# Patient Record
Sex: MALE | Race: WHITE | NOT HISPANIC OR LATINO | URBAN - METROPOLITAN AREA
[De-identification: names, ages, dates, MRNs, and addresses within clinical notes are randomized per-mention and may not be internally consistent; named-entity substitution may affect disease eponyms.]

---

## 2017-04-09 ENCOUNTER — INPATIENT (INPATIENT)
Facility: HOSPITAL | Age: 82
LOS: 4 days | Discharge: NURSING, REHAB OR LONG-TERM CARE | End: 2017-04-14
Payer: MEDICARE

## 2017-04-09 VITALS
OXYGEN SATURATION: 96 % | WEIGHT: 179.9 LBS | HEIGHT: 68 IN | TEMPERATURE: 99 F | RESPIRATION RATE: 18 BRPM | DIASTOLIC BLOOD PRESSURE: 68 MMHG | HEART RATE: 72 BPM | SYSTOLIC BLOOD PRESSURE: 135 MMHG

## 2017-04-09 DIAGNOSIS — I50.32 CHRONIC DIASTOLIC (CONGESTIVE) HEART FAILURE: ICD-10-CM

## 2017-04-09 DIAGNOSIS — Z98.890 OTHER SPECIFIED POSTPROCEDURAL STATES: Chronic | ICD-10-CM

## 2017-04-09 DIAGNOSIS — G20 PARKINSON'S DISEASE: ICD-10-CM

## 2017-04-09 DIAGNOSIS — I82.401 ACUTE EMBOLISM AND THROMBOSIS OF UNSPECIFIED DEEP VEINS OF RIGHT LOWER EXTREMITY: ICD-10-CM

## 2017-04-09 DIAGNOSIS — N17.9 ACUTE KIDNEY FAILURE, UNSPECIFIED: ICD-10-CM

## 2017-04-09 DIAGNOSIS — R26.9 UNSPECIFIED ABNORMALITIES OF GAIT AND MOBILITY: ICD-10-CM

## 2017-04-09 DIAGNOSIS — Z94.5 SKIN TRANSPLANT STATUS: Chronic | ICD-10-CM

## 2017-04-09 LAB
ALBUMIN SERPL ELPH-MCNC: 3 G/DL — LOW (ref 3.3–5)
ALP SERPL-CCNC: 127 U/L — HIGH (ref 40–120)
ALT FLD-CCNC: <6 U/L — LOW (ref 12–78)
ANION GAP SERPL CALC-SCNC: 11 MMOL/L — SIGNIFICANT CHANGE UP (ref 5–17)
APTT BLD: 30.5 SEC — SIGNIFICANT CHANGE UP (ref 27.5–37.4)
APTT BLD: > 200 SEC (ref 27.5–37.4)
AST SERPL-CCNC: 9 U/L — LOW (ref 15–37)
BASOPHILS # BLD AUTO: 0.1 K/UL — SIGNIFICANT CHANGE UP (ref 0–0.2)
BASOPHILS NFR BLD AUTO: 0.8 % — SIGNIFICANT CHANGE UP (ref 0–2)
BILIRUB SERPL-MCNC: 0.4 MG/DL — SIGNIFICANT CHANGE UP (ref 0.2–1.2)
BUN SERPL-MCNC: 56 MG/DL — HIGH (ref 7–23)
CALCIUM SERPL-MCNC: 9.1 MG/DL — SIGNIFICANT CHANGE UP (ref 8.5–10.1)
CHLORIDE SERPL-SCNC: 105 MMOL/L — SIGNIFICANT CHANGE UP (ref 96–108)
CO2 SERPL-SCNC: 27 MMOL/L — SIGNIFICANT CHANGE UP (ref 22–31)
CREAT SERPL-MCNC: 1.91 MG/DL — HIGH (ref 0.5–1.3)
D DIMER BLD IA.RAPID-MCNC: 2862 NG/ML DDU — HIGH
EOSINOPHIL # BLD AUTO: 0 K/UL — SIGNIFICANT CHANGE UP (ref 0–0.5)
EOSINOPHIL NFR BLD AUTO: 0.3 % — SIGNIFICANT CHANGE UP (ref 0–6)
GLUCOSE SERPL-MCNC: 111 MG/DL — HIGH (ref 70–99)
HCT VFR BLD CALC: 33.5 % — LOW (ref 39–50)
HCT VFR BLD CALC: 37.9 % — LOW (ref 39–50)
HGB BLD-MCNC: 11.3 G/DL — LOW (ref 13–17)
HGB BLD-MCNC: 12.4 G/DL — LOW (ref 13–17)
INR BLD: 1.11 RATIO — SIGNIFICANT CHANGE UP (ref 0.88–1.16)
LYMPHOCYTES # BLD AUTO: 1.1 K/UL — SIGNIFICANT CHANGE UP (ref 1–3.3)
LYMPHOCYTES # BLD AUTO: 13 % — SIGNIFICANT CHANGE UP (ref 13–44)
MCHC RBC-ENTMCNC: 29.2 PG — SIGNIFICANT CHANGE UP (ref 27–34)
MCHC RBC-ENTMCNC: 29.4 PG — SIGNIFICANT CHANGE UP (ref 27–34)
MCHC RBC-ENTMCNC: 32.6 GM/DL — SIGNIFICANT CHANGE UP (ref 32–36)
MCHC RBC-ENTMCNC: 33.6 GM/DL — SIGNIFICANT CHANGE UP (ref 32–36)
MCV RBC AUTO: 87.7 FL — SIGNIFICANT CHANGE UP (ref 80–100)
MCV RBC AUTO: 89.6 FL — SIGNIFICANT CHANGE UP (ref 80–100)
MONOCYTES # BLD AUTO: 0.8 K/UL — SIGNIFICANT CHANGE UP (ref 0–0.9)
MONOCYTES NFR BLD AUTO: 9.3 % — SIGNIFICANT CHANGE UP (ref 2–14)
NEUTROPHILS # BLD AUTO: 6.7 K/UL — SIGNIFICANT CHANGE UP (ref 1.8–7.4)
NEUTROPHILS NFR BLD AUTO: 76.6 % — SIGNIFICANT CHANGE UP (ref 43–77)
PLATELET # BLD AUTO: 258 K/UL — SIGNIFICANT CHANGE UP (ref 150–400)
PLATELET # BLD AUTO: 263 K/UL — SIGNIFICANT CHANGE UP (ref 150–400)
POTASSIUM SERPL-MCNC: 4.4 MMOL/L — SIGNIFICANT CHANGE UP (ref 3.5–5.3)
POTASSIUM SERPL-SCNC: 4.4 MMOL/L — SIGNIFICANT CHANGE UP (ref 3.5–5.3)
PROT SERPL-MCNC: 6.9 GM/DL — SIGNIFICANT CHANGE UP (ref 6–8.3)
PROTHROM AB SERPL-ACNC: 12 SEC — SIGNIFICANT CHANGE UP (ref 9.8–12.7)
RBC # BLD: 3.83 M/UL — LOW (ref 4.2–5.8)
RBC # BLD: 4.23 M/UL — SIGNIFICANT CHANGE UP (ref 4.2–5.8)
RBC # FLD: 13 % — SIGNIFICANT CHANGE UP (ref 10.3–14.5)
RBC # FLD: 13.7 % — SIGNIFICANT CHANGE UP (ref 10.3–14.5)
SODIUM SERPL-SCNC: 143 MMOL/L — SIGNIFICANT CHANGE UP (ref 135–145)
TROPONIN I SERPL-MCNC: <0.015 NG/ML — SIGNIFICANT CHANGE UP (ref 0.01–0.04)
TROPONIN I SERPL-MCNC: <0.015 NG/ML — SIGNIFICANT CHANGE UP (ref 0.01–0.04)
WBC # BLD: 8.7 K/UL — SIGNIFICANT CHANGE UP (ref 3.8–10.5)
WBC # BLD: 8.9 K/UL — SIGNIFICANT CHANGE UP (ref 3.8–10.5)
WBC # FLD AUTO: 8.7 K/UL — SIGNIFICANT CHANGE UP (ref 3.8–10.5)
WBC # FLD AUTO: 8.9 K/UL — SIGNIFICANT CHANGE UP (ref 3.8–10.5)

## 2017-04-09 PROCEDURE — 93970 EXTREMITY STUDY: CPT | Mod: 26

## 2017-04-09 PROCEDURE — 93010 ELECTROCARDIOGRAM REPORT: CPT

## 2017-04-09 PROCEDURE — 71010: CPT | Mod: 26

## 2017-04-09 PROCEDURE — 99285 EMERGENCY DEPT VISIT HI MDM: CPT

## 2017-04-09 RX ORDER — WARFARIN SODIUM 2.5 MG/1
2.5 TABLET ORAL ONCE
Qty: 0 | Refills: 0 | Status: COMPLETED | OUTPATIENT
Start: 2017-04-09 | End: 2017-04-09

## 2017-04-09 RX ORDER — POTASSIUM CHLORIDE 20 MEQ
0 PACKET (EA) ORAL
Qty: 0 | Refills: 0 | COMMUNITY

## 2017-04-09 RX ORDER — HEPARIN SODIUM 5000 [USP'U]/ML
6500 INJECTION INTRAVENOUS; SUBCUTANEOUS EVERY 6 HOURS
Qty: 0 | Refills: 0 | Status: DISCONTINUED | OUTPATIENT
Start: 2017-04-09 | End: 2017-04-13

## 2017-04-09 RX ORDER — HEPARIN SODIUM 5000 [USP'U]/ML
INJECTION INTRAVENOUS; SUBCUTANEOUS
Qty: 25000 | Refills: 0 | Status: DISCONTINUED | OUTPATIENT
Start: 2017-04-09 | End: 2017-04-13

## 2017-04-09 RX ORDER — CARBIDOPA AND LEVODOPA 25; 100 MG/1; MG/1
2 TABLET ORAL AT BEDTIME
Qty: 0 | Refills: 0 | Status: DISCONTINUED | OUTPATIENT
Start: 2017-04-09 | End: 2017-04-14

## 2017-04-09 RX ORDER — HEPARIN SODIUM 5000 [USP'U]/ML
3000 INJECTION INTRAVENOUS; SUBCUTANEOUS EVERY 6 HOURS
Qty: 0 | Refills: 0 | Status: DISCONTINUED | OUTPATIENT
Start: 2017-04-09 | End: 2017-04-13

## 2017-04-09 RX ORDER — CARBIDOPA AND LEVODOPA 25; 100 MG/1; MG/1
0 TABLET ORAL
Qty: 0 | Refills: 0 | COMMUNITY

## 2017-04-09 RX ORDER — CARBIDOPA AND LEVODOPA 25; 100 MG/1; MG/1
2 TABLET ORAL
Qty: 0 | Refills: 0 | COMMUNITY

## 2017-04-09 RX ORDER — SODIUM CHLORIDE 9 MG/ML
3 INJECTION INTRAMUSCULAR; INTRAVENOUS; SUBCUTANEOUS ONCE
Qty: 0 | Refills: 0 | Status: COMPLETED | OUTPATIENT
Start: 2017-04-09 | End: 2017-04-09

## 2017-04-09 RX ORDER — ASPIRIN/CALCIUM CARB/MAGNESIUM 324 MG
325 TABLET ORAL ONCE
Qty: 0 | Refills: 0 | Status: COMPLETED | OUTPATIENT
Start: 2017-04-09 | End: 2017-04-09

## 2017-04-09 RX ORDER — HEPARIN SODIUM 5000 [USP'U]/ML
6500 INJECTION INTRAVENOUS; SUBCUTANEOUS ONCE
Qty: 0 | Refills: 0 | Status: COMPLETED | OUTPATIENT
Start: 2017-04-09 | End: 2017-04-09

## 2017-04-09 RX ADMIN — HEPARIN SODIUM 1500 UNIT(S)/HR: 5000 INJECTION INTRAVENOUS; SUBCUTANEOUS at 16:48

## 2017-04-09 RX ADMIN — HEPARIN SODIUM 0 UNIT(S)/HR: 5000 INJECTION INTRAVENOUS; SUBCUTANEOUS at 23:30

## 2017-04-09 RX ADMIN — HEPARIN SODIUM 6500 UNIT(S): 5000 INJECTION INTRAVENOUS; SUBCUTANEOUS at 16:48

## 2017-04-09 RX ADMIN — Medication 325 MILLIGRAM(S): at 13:42

## 2017-04-09 RX ADMIN — WARFARIN SODIUM 2.5 MILLIGRAM(S): 2.5 TABLET ORAL at 23:38

## 2017-04-09 NOTE — ED ADULT NURSE REASSESSMENT NOTE - COMFORT CARE
repositioned/side rails up/plan of care explained
plan of care explained/repositioned/wait time explained/side rails up/po fluids offered
warm blanket provided/side rails up/pt incontinent.  Skin care given and linens changed pt repositioned for comfort and skin.

## 2017-04-09 NOTE — ED PROVIDER NOTE - NS ED MD SCRIBE ATTENDING SCRIBE SECTIONS
RESULTS/PROGRESS NOTE/PHYSICAL EXAM/PAST MEDICAL/SURGICAL/SOCIAL HISTORY/DISPOSITION/REVIEW OF SYSTEMS/HISTORY OF PRESENT ILLNESS

## 2017-04-09 NOTE — H&P ADULT - PROBLEM SELECTOR PLAN 3
Hold Lasix and reevaluate. Daily weight. Cardiology to advise. Hold Lasix and reevaluate. Daily weight. Cardiology to advise. Patient was scheduled to have Echo at office.

## 2017-04-09 NOTE — H&P ADULT - NSHPLABSRESULTS_GEN_ALL_CORE
EKG NSR @ 76 BANDAR JENKINS old when compared with EKG from June 26, 2015. 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    Venous Doppler US of legs: Occlusive thrombus is seen in the RIGHT common femoral,   superficial femoral and popliteal veins. Scant flow is seen within the   RIGHT peroneal and posterior tibial veins. Calf edema is seen.

## 2017-04-09 NOTE — ED ADULT NURSE NOTE - CHIEF COMPLAINT QUOTE
Patients daughter states that he had swelling last week in his ankle and is getting progressively worse no all the way up his right leg up to thigh. Cardiologist doubled the lasix.

## 2017-04-09 NOTE — ED PROVIDER NOTE - MEDICAL DECISION MAKING DETAILS
VQ scan is ordered. Patient is s/p heparin infusion. Patient is to be admitted for further inpatient evaluation. Hospitalist admission of the patient is appreciated. Melissa ZEPEDA: VQ scan is ordered. Patient is s/p heparin infusion. Patient is to be admitted for further inpatient evaluation. Hospitalist admission of the patient is appreciated.

## 2017-04-09 NOTE — H&P ADULT - NSHPSOCIALHISTORY_GEN_ALL_CORE
. Lives with wife.  Three children.  Retired dental technician.  Never smoked.   Occasional social drinker and not any more.
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
## 2017-04-09 NOTE — ED PROVIDER NOTE - PROGRESS NOTE DETAILS
Spoke with admitting hospitalist, Dr. Pelayo. Patient's admission is appreciated. Patient is s/p heparin. Elevated d-dimer. Creatinine high for CTA. V/Q scan ordered. Melissa ZEPEDA: Spoke with admitting hospitalist, Dr. Pelayo. Patient's admission is appreciated. Patient is s/p heparin. Elevated d-dimer. Creatinine high for CTA. V/Q scan ordered.

## 2017-04-09 NOTE — ED PROVIDER NOTE - CARE PLAN
Principal Discharge DX:	Deep vein thrombosis (DVT) of right lower extremity, unspecified chronicity, unspecified vein

## 2017-04-09 NOTE — H&P ADULT - HISTORY OF PRESENT ILLNESS
88 y/o M with hx of CHF, Parkinson's, presents for LE edema x 1 week, worsening. RLE swelling gradually went up to thigh, LLE swelling limited to foot. Denies CP, SOB, palpitations, abd pain, extremity pain. Pt saw Dr. Bronson recently who doubled his dose of lasix. 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 RLE swelling gradually went up to thigh, LLE swelling limited to foot. Denies CP, SOB, palpitations, abd pain, extremity pain. Pt saw Dr. Cedillo recently who doubled his dose of lasix from 20 mg to 40 mg and last few days 40mg BID. 89 y/o M with hx of Diastolic CHF, Parkinson's, RBBB, Gait Disorder uses walker and Heart Murmur presents for LE edema x 1 week, worsening. RLE swelling gradually went up to thigh, LLE swelling limited to foot. Denies CP, SOB, palpitations, abd pain, extremity pain. Pt saw Dr. Cedillo recently who doubled his dose of lasix from 20 mg to 40 mg and last few days 40mg BID.

## 2017-04-09 NOTE — H&P ADULT - PROBLEM SELECTOR PLAN 2
Hold Lasix and repeat BMP. Cardiology to advise. If renal dysfunction persist then renal us and consultation.

## 2017-04-09 NOTE — H&P ADULT - ASSESSMENT
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 RLE swelling gradually went up to thigh, LLE swelling limited to foot. Denies CP, SOB, palpitations, abd pain, extremity pain. Pt saw Dr. Cedillo recently who doubled his dose of lasix from 20 mg to 40 mg and last few days 40mg BID. Being admitted for right Femoral DVT with suspected MARCELL due to increased diuresis. 88 y/o M with hx of Diastolic CHF, Parkinson's, RBBB, Gait Disorder uses walker and Heart Murmur presents for LE edema x 1 week, worsening. RLE swelling gradually went up to thigh, LLE swelling limited to foot. Denies CP, SOB, palpitations, abd pain, extremity pain. Pt saw Dr. Cedillo recently who doubled his dose of lasix from 20 mg to 40 mg and last few days 40mg BID. Being admitted for right Femoral DVT with suspected MARCELL due to increased diuresis.

## 2017-04-09 NOTE — H&P ADULT - NSHPREVIEWOFSYSTEMS_GEN_ALL_CORE
Patient denies dysuria, bleeding, cough, difficulty swallowing, diarrhea, abdominal pain, headache, fall or LOC.   Rest of the review of systems is negative.

## 2017-04-09 NOTE — ED PROVIDER NOTE - DETAILS:
I Carrington Torres MD saw and examiend this patient. Scribe documented for me and under my supervision. I have made changes to scribe's documentation where necessary to reflect my history and physical exam findings. I agree with documentation as it appears on the chart now.

## 2017-04-09 NOTE — ED PROVIDER NOTE - OBJECTIVE STATEMENT
89 y/o M with hx of CHF, Parkinson's, presents for LE edema x 1 week, worsening. RLE swelling gradually went up to thigh, LLE swelling limited to foot. Denies CP, SOB, palpitations, abd pain, extremity pain. Pt saw Dr. Bronson recently who doubled his dose of lasix. 88 y/o M with hx of CHF, Parkinson's, presents for LE edema x 1 week, worsening. RLE swelling gradually went up to thigh, LLE swelling limited to foot. Denies CP, SOB, palpitations, abd pain,  or extremity pain. Pt saw Dr. Bronson recently who doubled his dose of lasix. Here to rule out DVT.

## 2017-04-09 NOTE — ED PROVIDER NOTE - MUSCULOSKELETAL, MLM
Spine appears normal, range of motion is not limited, no muscle or joint tenderness Spine appears normal, range of motion is not limited, no muscle or joint tenderness. RLE: 2+ pitting edema up to the thigh. LLE: 2+ pitting edema limited to the foot. Spine appears normal, range of motion is not limited, no muscle or joint tenderness. RLE: 2+ pitting edema up to the thigh. LLE: 2+ pitting edema limited to the dorsum of foot.

## 2017-04-09 NOTE — H&P ADULT - NSHPPHYSICALEXAM_GEN_ALL_CORE
T(C): 37.1, Max: 37.4 (04-09 @ 12:01)  HR: 77 (72 - 77)  BP: 107/44 (101/37 - 152/44)  RR: 16 (15 - 18)  SpO2: 100% (96% - 100%)

## 2017-04-09 NOTE — ED PROVIDER NOTE - CONSTITUTIONAL, MLM
normal... Well appearing, well nourished, awake, alert, oriented to person, place, time/situation and in no apparent distress. Well appearing, well nourished, awake, alert, oriented to person, place, time/situation and in no apparent distress. Nontoxic appearance.

## 2017-04-09 NOTE — H&P ADULT - PROBLEM SELECTOR PLAN 1
IV Heparin and Coumadin. Follow up INR. If renal function improves then reevaluate for newer agents. Risks of bleeding discussed especially with fall. Daughter, patient and wife would like to continue anticoagulation.

## 2017-04-09 NOTE — ED ADULT NURSE REASSESSMENT NOTE - NS ED NURSE REASSESS COMMENT FT1
Pt noted to have an area of skin breakdown around the coccyx.  2 quarter size circles purple in color.  Pt positioned on side and supported by pillows.  Family made aware of skin condition.  Left arm noted to have multiple areas of ecchymosis.

## 2017-04-09 NOTE — H&P ADULT - ATTENDING COMMENTS
Diet: Low salt and cholesterol     RADIOLOGY & ADDITIONAL TESTS:    Imaging Personally Reviewed:  [ x] YES  [ ] NO    Consultant(s) Notes Reviewed:  [ ] YES  [ ] NO       DVT Prophylaxis:  IV Heparin [  x]     LMWH [ ]     Coumadin [ x]    Xaeralto [ ]    Eliquis [ ]   Venodyne pumps [ ]    Discussed with Patient [x ]     Family [x ]          [ ]   RN[x ]      [ ]    Advance Directives: Spoke with family about advance directives including HCP.        Care Discussed with Consultants/Other Providers [ ] YES  [ ] NO PCP and cardiology office contacted and notified.

## 2017-04-10 LAB
ANION GAP SERPL CALC-SCNC: 9 MMOL/L — SIGNIFICANT CHANGE UP (ref 5–17)
APTT BLD: 167.9 SEC — CRITICAL HIGH (ref 27.5–37.4)
APTT BLD: 70.1 SEC — HIGH (ref 27.5–37.4)
APTT BLD: 90.3 SEC — HIGH (ref 27.5–37.4)
BUN SERPL-MCNC: 51 MG/DL — HIGH (ref 7–23)
CALCIUM SERPL-MCNC: 8.6 MG/DL — SIGNIFICANT CHANGE UP (ref 8.5–10.1)
CHLORIDE SERPL-SCNC: 108 MMOL/L — SIGNIFICANT CHANGE UP (ref 96–108)
CO2 SERPL-SCNC: 28 MMOL/L — SIGNIFICANT CHANGE UP (ref 22–31)
CREAT SERPL-MCNC: 1.59 MG/DL — HIGH (ref 0.5–1.3)
GLUCOSE SERPL-MCNC: 93 MG/DL — SIGNIFICANT CHANGE UP (ref 70–99)
HCT VFR BLD CALC: 33.7 % — LOW (ref 39–50)
HGB BLD-MCNC: 11 G/DL — LOW (ref 13–17)
INR BLD: 1.25 RATIO — HIGH (ref 0.88–1.16)
MCHC RBC-ENTMCNC: 29.1 PG — SIGNIFICANT CHANGE UP (ref 27–34)
MCHC RBC-ENTMCNC: 32.6 GM/DL — SIGNIFICANT CHANGE UP (ref 32–36)
MCV RBC AUTO: 89.2 FL — SIGNIFICANT CHANGE UP (ref 80–100)
PLATELET # BLD AUTO: 217 K/UL — SIGNIFICANT CHANGE UP (ref 150–400)
POTASSIUM SERPL-MCNC: 4.3 MMOL/L — SIGNIFICANT CHANGE UP (ref 3.5–5.3)
POTASSIUM SERPL-SCNC: 4.3 MMOL/L — SIGNIFICANT CHANGE UP (ref 3.5–5.3)
PROTHROM AB SERPL-ACNC: 13.6 SEC — HIGH (ref 9.8–12.7)
RBC # BLD: 3.78 M/UL — LOW (ref 4.2–5.8)
RBC # FLD: 13.2 % — SIGNIFICANT CHANGE UP (ref 10.3–14.5)
SODIUM SERPL-SCNC: 145 MMOL/L — SIGNIFICANT CHANGE UP (ref 135–145)
WBC # BLD: 7.3 K/UL — SIGNIFICANT CHANGE UP (ref 3.8–10.5)
WBC # FLD AUTO: 7.3 K/UL — SIGNIFICANT CHANGE UP (ref 3.8–10.5)

## 2017-04-10 PROCEDURE — 93306 TTE W/DOPPLER COMPLETE: CPT | Mod: 26

## 2017-04-10 PROCEDURE — 78582 LUNG VENTILAT&PERFUS IMAGING: CPT | Mod: 26

## 2017-04-10 PROCEDURE — 93010 ELECTROCARDIOGRAM REPORT: CPT

## 2017-04-10 RX ORDER — WARFARIN SODIUM 2.5 MG/1
5 TABLET ORAL DAILY
Qty: 0 | Refills: 0 | Status: DISCONTINUED | OUTPATIENT
Start: 2017-04-10 | End: 2017-04-13

## 2017-04-10 RX ADMIN — HEPARIN SODIUM 1200 UNIT(S)/HR: 5000 INJECTION INTRAVENOUS; SUBCUTANEOUS at 00:39

## 2017-04-10 RX ADMIN — WARFARIN SODIUM 5 MILLIGRAM(S): 2.5 TABLET ORAL at 21:40

## 2017-04-10 RX ADMIN — CARBIDOPA AND LEVODOPA 2 TABLET(S): 25; 100 TABLET ORAL at 00:34

## 2017-04-10 RX ADMIN — HEPARIN SODIUM 900 UNIT(S)/HR: 5000 INJECTION INTRAVENOUS; SUBCUTANEOUS at 22:37

## 2017-04-10 RX ADMIN — HEPARIN SODIUM 900 UNIT(S)/HR: 5000 INJECTION INTRAVENOUS; SUBCUTANEOUS at 15:56

## 2017-04-10 RX ADMIN — HEPARIN SODIUM 900 UNIT(S)/HR: 5000 INJECTION INTRAVENOUS; SUBCUTANEOUS at 09:10

## 2017-04-10 RX ADMIN — HEPARIN SODIUM 0 UNIT(S)/HR: 5000 INJECTION INTRAVENOUS; SUBCUTANEOUS at 07:54

## 2017-04-10 RX ADMIN — CARBIDOPA AND LEVODOPA 2 TABLET(S): 25; 100 TABLET ORAL at 21:40

## 2017-04-10 NOTE — DIETITIAN INITIAL EVALUATION ADULT. - ADHERENCE
good/wife does all cooking at home and does not add salt to foods and try to choose leaner proteins. Pt enjoys fish

## 2017-04-10 NOTE — PHYSICAL THERAPY INITIAL EVALUATION ADULT - PERTINENT HX OF CURRENT PROBLEM, REHAB EVAL
Pt admitted to  secondary to worsening right LE edema. Doppler right LE: occlusive thrombus seen in the right common femoral, superficial femoral, and popliteal veins. VQ scan: abnormal scan. Intermediate probability of PE.

## 2017-04-10 NOTE — DIETITIAN INITIAL EVALUATION ADULT. - OTHER INFO
Pt's wife and daughter at bedside and state the pt always has a great appetite. Pt shows no current signs of muscle or fat wasting. Fluid retention may mask weight loss. Pt drinks 2 boost shakes/day at home

## 2017-04-10 NOTE — PROGRESS NOTE ADULT - SUBJECTIVE AND OBJECTIVE BOX
89 y/o M with hx of Diastolic CHF, Parkinson's, RBBB, Gait Disorder uses walker and Heart Murmur presents for RLE edema x 1 week, worsening. RLE swelling gradually went up to thigh, LLE swelling limited to foot. Denies CP, SOB, palpitations, abd pain, extremity pain. Pt saw Dr. Cedillo recently who doubled his dose of lasix from 20 mg to 40 mg and last few days 40mg BID.  -found to have RLE DVT, PE and ARF    4.10: no cp, no sob, no n/v/d      REVIEW OF SYSTEMS:    CONSTITUTIONAL: No weakness, No fevers or chills  ENT: No ear ache, No sorethroat  NECK: No pain, No stiffness  RESPIRATORY: No cough, No wheezing, No hemoptysis; No dyspnea  CARDIOVASCULAR: No chest pain, No palpitations  GASTROINTESTINAL: No abd pain, No nausea, No vomiting, No hematemesis, No diarrhea or constipation. No melena, No hematochezia.  GENITOURINARY: No dysuria, No  hematuria  NEUROLOGICAL: No diplopia, No paresthesia, No motor dysfunction  MUSCULOSKELETAL: No arthralgia, No myalgia, +RLE swelling  SKIN: No rashes, or lesions   PSYCH: no anxiety, no suicidal ideation    All other review of systems is negative unless indicated above    Vital Signs Last 24 Hrs  T(C): 37.3, Max: 37.3 (04-09 @ 21:05)  T(F): 99.2, Max: 99.2 (04-09 @ 21:05)  HR: 68 (68 - 77)  BP: 115/42 (107/44 - 152/44)  BP(mean): --  RR: 16 (15 - 16)  SpO2: 99% (96% - 100%)    PHYSICAL EXAM:    GENERAL: NAD, Well nourished  HEENT:  NC/AT, EOMI, PERRLA, No scleral icterus, Moist mucous membranes  NECK: Supple, No JVD  CNS:  Alert & Oriented X3, Motor Strength 5/5 B/L upper and lower extremities; DTRs 2+ intact   LUNG: Normal Breath sounds, Clear to auscultation bilaterally, No rales, No rhonchi, No wheezing  HEART: RRR; No murmurs, No rubs  ABDOMEN: +BS, ST/ND/NT  GENITOURINARY: Voiding, Bladder not distended  EXTREMITIES:  2+ Peripheral Pulses, No clubbing, No cyanosis, No tibial edema  MUSCULOSKELTAL: Joints normal ROM, No TTP, No effusion  VAGINAL: deferred  RECTAL: deferred, not indicated  BREAST: deferred                          11.0   7.3   )-----------( 217      ( 10 Apr 2017 06:37 )             33.7     04-10    145  |  108  |  51<H>  ----------------------------<  93  4.3   |  28  |  1.59<H>    Ca    8.6      10 Apr 2017 06:37    TPro  6.9  /  Alb  3.0<L>  /  TBili  0.4  /  DBili  x   /  AST  9<L>  /  ALT  <6<L>  /  AlkPhos  127<H>  04-09      MEDICATIONS  (STANDING):  heparin  Infusion. Unit(s)/Hr IV Continuous <Continuous>  carbidopa/levodopa  25/100 2Tablet(s) Oral at bedtime    MEDICATIONS  (PRN):  heparin  Injectable 6500Unit(s) IV Push every 6 hours PRN For aPTT less than 40  heparin  Injectable 3000Unit(s) IV Push every 6 hours PRN For aPTT between 40 - 57                  Assessment and Plan:   89 y/o M with hx of Diastolic CHF, Parkinson's, RBBB, Gait Disorder uses walker and Heart Murmur presents for LE edema x 1 week, worsening. RLE swelling gradually went up to thigh, LLE swelling limited to foot. Denies CP, SOB, palpitations, abd pain, extremity pain. Pt saw Dr. Cedillo recently who doubled his dose of lasix from 20 mg to 40 mg and last few days 40mg BID. Being admitted for right Femoral DVT with suspected MARCELL due to increased diuresis.         ·   RLE DVT/PE unspecified chronicity, unspecified vein  IV Heparin and Coumadin. Follow up INR. If renal function improves then reevaluate for newer agents. Risks of bleeding discussed with family  V/Q scan noted    ·   MARCELL (acute kidney injury) due to diurrhetic induced prerenal azotemia  Hold Lasix and repeat BMP.      ·  Chronic diastolic congestive heart failure, compensated   Hold Lasix and reevaluate. Daily weight.      ·  Parkinson disease.  Plan: Medication and PT.       ·  Gait disorder.  Plan: PT.

## 2017-04-10 NOTE — PHYSICAL THERAPY INITIAL EVALUATION ADULT - PASSIVE RANGE OF MOTION EXAMINATION, REHAB EVAL
Bilateral shoulder flexion ~ 140 degrees, bilateral hip flexion ~ 90 degrees, right knee flex/ext ~20-~90 degrees, left knee flex/ext ~30-~90 degrees, and DF neutral./no Passive ROM deficits were identified

## 2017-04-11 LAB
ANION GAP SERPL CALC-SCNC: 9 MMOL/L — SIGNIFICANT CHANGE UP (ref 5–17)
APTT BLD: 72.5 SEC — HIGH (ref 27.5–37.4)
BUN SERPL-MCNC: 48 MG/DL — HIGH (ref 7–23)
CALCIUM SERPL-MCNC: 8.9 MG/DL — SIGNIFICANT CHANGE UP (ref 8.5–10.1)
CHLORIDE SERPL-SCNC: 106 MMOL/L — SIGNIFICANT CHANGE UP (ref 96–108)
CO2 SERPL-SCNC: 29 MMOL/L — SIGNIFICANT CHANGE UP (ref 22–31)
CREAT SERPL-MCNC: 1.53 MG/DL — HIGH (ref 0.5–1.3)
GLUCOSE SERPL-MCNC: 92 MG/DL — SIGNIFICANT CHANGE UP (ref 70–99)
HCT VFR BLD CALC: 34.8 % — LOW (ref 39–50)
HGB BLD-MCNC: 11.5 G/DL — LOW (ref 13–17)
INR BLD: 1.21 RATIO — HIGH (ref 0.88–1.16)
MCHC RBC-ENTMCNC: 29.7 PG — SIGNIFICANT CHANGE UP (ref 27–34)
MCHC RBC-ENTMCNC: 33 GM/DL — SIGNIFICANT CHANGE UP (ref 32–36)
MCV RBC AUTO: 90 FL — SIGNIFICANT CHANGE UP (ref 80–100)
PLATELET # BLD AUTO: 238 K/UL — SIGNIFICANT CHANGE UP (ref 150–400)
POTASSIUM SERPL-MCNC: 4.2 MMOL/L — SIGNIFICANT CHANGE UP (ref 3.5–5.3)
POTASSIUM SERPL-SCNC: 4.2 MMOL/L — SIGNIFICANT CHANGE UP (ref 3.5–5.3)
PROTHROM AB SERPL-ACNC: 13.1 SEC — HIGH (ref 9.8–12.7)
RBC # BLD: 3.87 M/UL — LOW (ref 4.2–5.8)
RBC # FLD: 13.1 % — SIGNIFICANT CHANGE UP (ref 10.3–14.5)
SODIUM SERPL-SCNC: 144 MMOL/L — SIGNIFICANT CHANGE UP (ref 135–145)
WBC # BLD: 7.2 K/UL — SIGNIFICANT CHANGE UP (ref 3.8–10.5)
WBC # FLD AUTO: 7.2 K/UL — SIGNIFICANT CHANGE UP (ref 3.8–10.5)

## 2017-04-11 RX ORDER — SODIUM CHLORIDE 9 MG/ML
1000 INJECTION INTRAMUSCULAR; INTRAVENOUS; SUBCUTANEOUS
Qty: 0 | Refills: 0 | Status: DISCONTINUED | OUTPATIENT
Start: 2017-04-11 | End: 2017-04-14

## 2017-04-11 RX ADMIN — SODIUM CHLORIDE 75 MILLILITER(S): 9 INJECTION INTRAMUSCULAR; INTRAVENOUS; SUBCUTANEOUS at 16:16

## 2017-04-11 RX ADMIN — CARBIDOPA AND LEVODOPA 2 TABLET(S): 25; 100 TABLET ORAL at 22:26

## 2017-04-11 RX ADMIN — WARFARIN SODIUM 5 MILLIGRAM(S): 2.5 TABLET ORAL at 22:26

## 2017-04-11 RX ADMIN — HEPARIN SODIUM 900 UNIT(S)/HR: 5000 INJECTION INTRAVENOUS; SUBCUTANEOUS at 09:07

## 2017-04-11 NOTE — PROGRESS NOTE ADULT - SUBJECTIVE AND OBJECTIVE BOX
88 y/o M with hx of Diastolic CHF, Parkinson's, RBBB, Gait Disorder uses walker and Heart Murmur presents for RLE edema x 1 week, worsening. RLE swelling gradually went up to thigh, LLE swelling limited to foot. Denies CP, SOB, palpitations, abd pain, extremity pain. Pt saw Dr. Cedillo recently who doubled his dose of lasix from 20 mg to 40 mg and last few days 40mg BID.  -found to have RLE DVT, PE and ARF    4.10: no cp, no sob, no n/v/d  4.11: no cp, no dyspnea      REVIEW OF SYSTEMS:    CONSTITUTIONAL: No weakness, No fevers or chills  ENT: No ear ache, No sorethroat  NECK: No pain, No stiffness  RESPIRATORY: No cough, No wheezing, No hemoptysis; No dyspnea  CARDIOVASCULAR: No chest pain, No palpitations  GASTROINTESTINAL: No abd pain, No nausea, No vomiting, No hematemesis, No diarrhea or constipation. No melena, No hematochezia.  GENITOURINARY: No dysuria, No  hematuria  NEUROLOGICAL: No diplopia, No paresthesia, No motor dysfunction  MUSCULOSKELETAL: No arthralgia, No myalgia, +RLE swelling  SKIN: No rashes, or lesions   PSYCH: no anxiety, no suicidal ideation    All other review of systems is negative unless indicated above    Vital Signs Last 24 Hrs  T(C): 37.2, Max: 37.4 (04-10 @ 19:23)  T(F): 99, Max: 99.3 (04-10 @ 19:23)  HR: 75 (74 - 76)  BP: 109/45 (109/45 - 145/54)  BP(mean): --  RR: 18 (17 - 18)  SpO2: 97% (96% - 100%)    PHYSICAL EXAM:    GENERAL: NAD, Well nourished  HEENT:  NC/AT, EOMI, PERRLA, No scleral icterus, Moist mucous membranes  NECK: Supple, No JVD  CNS:  Alert & Oriented X3, Motor Strength 5/5 B/L upper and lower extremities; DTRs 2+ intact   LUNG: Normal Breath sounds, Clear to auscultation bilaterally, No rales, No rhonchi, No wheezing  HEART: RRR; No murmurs, No rubs  ABDOMEN: +BS, ST/ND/NT  GENITOURINARY: Voiding, Bladder not distended  EXTREMITIES:  2+ Peripheral Pulses, No clubbing, No cyanosis, No tibial edema  MUSCULOSKELTAL: Joints normal ROM, No TTP, No effusion  VAGINAL: deferred  RECTAL: deferred, not indicated  BREAST: deferred    Labs:                        11.5   7.2   )-----------( 238      ( 11 Apr 2017 05:56 )             34.8     04-11    144  |  106  |  48<H>  ----------------------------<  92  4.2   |  29  |  1.53<H>    Ca    8.9      11 Apr 2017 05:56      MEDICATIONS  (STANDING):  heparin  Infusion. Unit(s)/Hr IV Continuous <Continuous>  carbidopa/levodopa  25/100 2Tablet(s) Oral at bedtime    MEDICATIONS  (PRN):  heparin  Injectable 6500Unit(s) IV Push every 6 hours PRN For aPTT less than 40  heparin  Injectable 3000Unit(s) IV Push every 6 hours PRN For aPTT between 40 - 57      A/P:      ·   RLE DVT/PE unspecified chronicity, unspecified vein  IV Heparin and Coumadin. Follow up INR. If renal function improves then reevaluate for newer agents. Risks of bleeding discussed with family  V/Q scan noted    ·   MARCELL (acute kidney injury) due to diurrhetic induced prerenal azotemia  Hold Lasix and repeat BMP.      ·  Chronic diastolic congestive heart failure, compensated   Hold Lasix and reevaluate. Daily weight.      ·  Parkinson disease.  Plan: Medication and PT.       ·  Gait disorder.  Plan: PT.

## 2017-04-12 LAB
ANION GAP SERPL CALC-SCNC: 7 MMOL/L — SIGNIFICANT CHANGE UP (ref 5–17)
APTT BLD: 139 SEC — CRITICAL HIGH (ref 27.5–37.4)
APTT BLD: 55.4 SEC — HIGH (ref 27.5–37.4)
APTT BLD: 56.7 SEC — HIGH (ref 27.5–37.4)
BUN SERPL-MCNC: 42 MG/DL — HIGH (ref 7–23)
CALCIUM SERPL-MCNC: 8.7 MG/DL — SIGNIFICANT CHANGE UP (ref 8.5–10.1)
CHLORIDE SERPL-SCNC: 108 MMOL/L — SIGNIFICANT CHANGE UP (ref 96–108)
CO2 SERPL-SCNC: 28 MMOL/L — SIGNIFICANT CHANGE UP (ref 22–31)
CREAT SERPL-MCNC: 1.27 MG/DL — SIGNIFICANT CHANGE UP (ref 0.5–1.3)
GLUCOSE SERPL-MCNC: 95 MG/DL — SIGNIFICANT CHANGE UP (ref 70–99)
HCT VFR BLD CALC: 33.2 % — LOW (ref 39–50)
HGB BLD-MCNC: 11.1 G/DL — LOW (ref 13–17)
INR BLD: 1.31 RATIO — HIGH (ref 0.88–1.16)
MCHC RBC-ENTMCNC: 29.9 PG — SIGNIFICANT CHANGE UP (ref 27–34)
MCHC RBC-ENTMCNC: 33.5 GM/DL — SIGNIFICANT CHANGE UP (ref 32–36)
MCV RBC AUTO: 89.4 FL — SIGNIFICANT CHANGE UP (ref 80–100)
PLATELET # BLD AUTO: 216 K/UL — SIGNIFICANT CHANGE UP (ref 150–400)
POTASSIUM SERPL-MCNC: 4.2 MMOL/L — SIGNIFICANT CHANGE UP (ref 3.5–5.3)
POTASSIUM SERPL-SCNC: 4.2 MMOL/L — SIGNIFICANT CHANGE UP (ref 3.5–5.3)
PROTHROM AB SERPL-ACNC: 14.2 SEC — HIGH (ref 9.8–12.7)
RBC # BLD: 3.72 M/UL — LOW (ref 4.2–5.8)
RBC # FLD: 12.8 % — SIGNIFICANT CHANGE UP (ref 10.3–14.5)
SODIUM SERPL-SCNC: 143 MMOL/L — SIGNIFICANT CHANGE UP (ref 135–145)
WBC # BLD: 7.7 K/UL — SIGNIFICANT CHANGE UP (ref 3.8–10.5)
WBC # FLD AUTO: 7.7 K/UL — SIGNIFICANT CHANGE UP (ref 3.8–10.5)

## 2017-04-12 RX ADMIN — HEPARIN SODIUM 0 UNIT(S)/HR: 5000 INJECTION INTRAVENOUS; SUBCUTANEOUS at 14:45

## 2017-04-12 RX ADMIN — HEPARIN SODIUM 1100 UNIT(S)/HR: 5000 INJECTION INTRAVENOUS; SUBCUTANEOUS at 07:39

## 2017-04-12 RX ADMIN — SODIUM CHLORIDE 75 MILLILITER(S): 9 INJECTION INTRAMUSCULAR; INTRAVENOUS; SUBCUTANEOUS at 17:28

## 2017-04-12 RX ADMIN — SODIUM CHLORIDE 75 MILLILITER(S): 9 INJECTION INTRAMUSCULAR; INTRAVENOUS; SUBCUTANEOUS at 09:23

## 2017-04-12 RX ADMIN — WARFARIN SODIUM 5 MILLIGRAM(S): 2.5 TABLET ORAL at 22:14

## 2017-04-12 RX ADMIN — CARBIDOPA AND LEVODOPA 2 TABLET(S): 25; 100 TABLET ORAL at 22:14

## 2017-04-12 RX ADMIN — HEPARIN SODIUM 3000 UNIT(S): 5000 INJECTION INTRAVENOUS; SUBCUTANEOUS at 07:45

## 2017-04-12 RX ADMIN — HEPARIN SODIUM 800 UNIT(S)/HR: 5000 INJECTION INTRAVENOUS; SUBCUTANEOUS at 16:33

## 2017-04-12 NOTE — PROGRESS NOTE ADULT - SUBJECTIVE AND OBJECTIVE BOX
88 y/o M with hx of Diastolic CHF, Parkinson's, RBBB, Gait Disorder uses walker and Heart Murmur presents for RLE edema x 1 week, worsening. RLE swelling gradually went up to thigh, LLE swelling limited to foot. Denies CP, SOB, palpitations, abd pain, extremity pain. Pt saw Dr. Cedillo recently who doubled his dose of lasix from 20 mg to 40 mg and last few days 40mg BID.  -found to have RLE DVT, PE and ARF    4.10: no cp, no sob, no n/v/d  4.11: no cp, no dyspnea  4.12: no cp, no sob, no n/v/d  Tele: NSR      REVIEW OF SYSTEMS:    CONSTITUTIONAL: No weakness, No fevers or chills  ENT: No ear ache, No sorethroat  NECK: No pain, No stiffness  RESPIRATORY: No cough, No wheezing, No hemoptysis; No dyspnea  CARDIOVASCULAR: No chest pain, No palpitations  GASTROINTESTINAL: No abd pain, No nausea, No vomiting, No hematemesis, No diarrhea or constipation. No melena, No hematochezia.  GENITOURINARY: No dysuria, No  hematuria  NEUROLOGICAL: No diplopia, No paresthesia, No motor dysfunction  MUSCULOSKELETAL: No arthralgia, No myalgia, +RLE swelling  SKIN: No rashes, or lesions   PSYCH: no anxiety, no suicidal ideation    All other review of systems is negative unless indicated above    Vital Signs Last 24 Hrs  T(C): 37.3, Max: 37.6 (04-11 @ 20:46)  T(F): 99.2, Max: 99.6 (04-11 @ 20:46)  HR: 75 (74 - 76)  BP: 123/50 (99/58 - 145/52)  BP(mean): --  RR: 16 (16 - 16)  SpO2: 97% (97% - 97%)    PHYSICAL EXAM:    GENERAL: NAD, Well nourished  HEENT:  NC/AT, EOMI, PERRLA, No scleral icterus, Moist mucous membranes  NECK: Supple, No JVD  CNS:  Alert & Oriented X3, Motor Strength 5/5 B/L upper and lower extremities; DTRs 2+ intact   LUNG: Normal Breath sounds, Clear to auscultation bilaterally, No rales, No rhonchi, No wheezing  HEART: RRR; No murmurs, No rubs  ABDOMEN: +BS, ST/ND/NT  GENITOURINARY: Voiding, Bladder not distended  EXTREMITIES:  2+ Peripheral Pulses, No clubbing, No cyanosis, No tibial edema  MUSCULOSKELTAL: Joints normal ROM, No TTP, No effusion  VAGINAL: deferred  RECTAL: deferred, not indicated  BREAST: deferred    Labs:                        11.1   7.7   )-----------( 216      ( 12 Apr 2017 05:38 )             33.2     04-12    143  |  108  |  42<H>  ----------------------------<  95  4.2   |  28  |  1.27    Ca    8.7      12 Apr 2017 05:38               MEDICATIONS  (STANDING):  heparin  Infusion. Unit(s)/Hr IV Continuous <Continuous>  carbidopa/levodopa  25/100 2Tablet(s) Oral at bedtime    MEDICATIONS  (PRN):  heparin  Injectable 6500Unit(s) IV Push every 6 hours PRN For aPTT less than 40  heparin  Injectable 3000Unit(s) IV Push every 6 hours PRN For aPTT between 40 - 57      A/P:      ·   RLE DVT/PE  V/Q scan noted: intermediate prob for PE  IV Heparin and Coumadin. Follow up INR.   If renal function improves (GFR>60) then reevaluate for newer agents. Risks of bleeding discussed with family      ·   MARCELL (acute kidney injury) due to diurrhetic induced prerenal azotemia: improving  Hold Lasix and repeat BMP.      ·  Chronic diastolic congestive heart failure, compensated  hold lasix due to MARCELL      ·  Parkinson disease.  Plan: Medication and PT.       ·  Gait disorder.  Plan: PT.
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
## 2017-04-13 LAB
APTT BLD: 142.4 SEC — CRITICAL HIGH (ref 27.5–37.4)
HCT VFR BLD CALC: 32.3 % — LOW (ref 39–50)
HGB BLD-MCNC: 10.6 G/DL — LOW (ref 13–17)
INR BLD: 1.59 RATIO — HIGH (ref 0.88–1.16)
MCHC RBC-ENTMCNC: 29.3 PG — SIGNIFICANT CHANGE UP (ref 27–34)
MCHC RBC-ENTMCNC: 32.8 GM/DL — SIGNIFICANT CHANGE UP (ref 32–36)
MCV RBC AUTO: 89.5 FL — SIGNIFICANT CHANGE UP (ref 80–100)
PLATELET # BLD AUTO: 216 K/UL — SIGNIFICANT CHANGE UP (ref 150–400)
PROTHROM AB SERPL-ACNC: 17.3 SEC — HIGH (ref 9.8–12.7)
RBC # BLD: 3.61 M/UL — LOW (ref 4.2–5.8)
RBC # FLD: 13 % — SIGNIFICANT CHANGE UP (ref 10.3–14.5)
WBC # BLD: 6.6 K/UL — SIGNIFICANT CHANGE UP (ref 3.8–10.5)
WBC # FLD AUTO: 6.6 K/UL — SIGNIFICANT CHANGE UP (ref 3.8–10.5)

## 2017-04-13 RX ORDER — APIXABAN 2.5 MG/1
10 TABLET, FILM COATED ORAL
Qty: 0 | Refills: 0 | Status: DISCONTINUED | OUTPATIENT
Start: 2017-04-13 | End: 2017-04-14

## 2017-04-13 RX ORDER — WARFARIN SODIUM 2.5 MG/1
6 TABLET ORAL DAILY
Qty: 0 | Refills: 0 | Status: DISCONTINUED | OUTPATIENT
Start: 2017-04-13 | End: 2017-04-13

## 2017-04-13 RX ORDER — APIXABAN 2.5 MG/1
2 TABLET, FILM COATED ORAL
Qty: 28 | Refills: 0 | OUTPATIENT
Start: 2017-04-13 | End: 2017-04-20

## 2017-04-13 RX ORDER — APIXABAN 2.5 MG/1
1 TABLET, FILM COATED ORAL
Qty: 60 | Refills: 3 | OUTPATIENT
Start: 2017-04-13 | End: 2017-08-10

## 2017-04-13 RX ADMIN — HEPARIN SODIUM 3000 UNIT(S): 5000 INJECTION INTRAVENOUS; SUBCUTANEOUS at 00:15

## 2017-04-13 RX ADMIN — SODIUM CHLORIDE 75 MILLILITER(S): 9 INJECTION INTRAMUSCULAR; INTRAVENOUS; SUBCUTANEOUS at 19:07

## 2017-04-13 RX ADMIN — APIXABAN 10 MILLIGRAM(S): 2.5 TABLET, FILM COATED ORAL at 18:08

## 2017-04-13 RX ADMIN — CARBIDOPA AND LEVODOPA 2 TABLET(S): 25; 100 TABLET ORAL at 21:29

## 2017-04-13 RX ADMIN — HEPARIN SODIUM 1000 UNIT(S)/HR: 5000 INJECTION INTRAVENOUS; SUBCUTANEOUS at 00:12

## 2017-04-13 RX ADMIN — HEPARIN SODIUM 0 UNIT(S)/HR: 5000 INJECTION INTRAVENOUS; SUBCUTANEOUS at 07:57

## 2017-04-13 RX ADMIN — HEPARIN SODIUM 700 UNIT(S)/HR: 5000 INJECTION INTRAVENOUS; SUBCUTANEOUS at 09:39

## 2017-04-13 NOTE — PROGRESS NOTE ADULT - SUBJECTIVE AND OBJECTIVE BOX
88 y/o M with hx of Diastolic CHF, Parkinson's, RBBB, Gait Disorder uses walker and Heart Murmur presents for RLE edema x 1 week, worsening. RLE swelling gradually went up to thigh, LLE swelling limited to foot. Denies CP, SOB, palpitations, abd pain, extremity pain. Pt saw Dr. Cedillo recently who doubled his dose of lasix from 20 mg to 40 mg and last few days 40mg BID.  -found to have RLE DVT, PE and ARF    4.10: no cp, no sob, no n/v/d  4.11: no cp, no dyspnea  4.12: no cp, no sob, no n/v/d  Tele: NSR            A/P:      ·   RLE DVT/PE  V/Q scan noted: intermediate prob for PE  IV Heparin and Coumadin. Follow up INR.   If renal function improves (GFR>60) then reevaluate for newer agents. Risks of bleeding discussed with family      ·   MARCELL (acute kidney injury) due to diurrhetic induced prerenal azotemia: improving  Hold Lasix and repeat BMP.      ·  Chronic diastolic congestive heart failure, compensated  hold lasix due to MARCELL      ·  Parkinson disease.  Plan: Medication and PT.       ·  Gait disorder.  Plan: PT. CHIEF COMPLAINT:leg swelling    SUBJECTIVE: no complaints    Objective: nad, axox3    REVIEW OF SYSTEMS:    CONSTITUTIONAL: No weakness, fevers or chills  EYES/ENT: No visual changes;  No vertigo or throat pain   NECK: No pain or stiffness  RESPIRATORY: No cough, wheezing, hemoptysis; No shortness of breath  CARDIOVASCULAR: No chest pain or palpitations  GASTROINTESTINAL: No abdominal or epigastric pain. No nausea, vomiting, or hematemesis; No diarrhea or constipation. No melena or hematochezia.  GENITOURINARY: No dysuria, frequency or hematuria  NEUROLOGICAL: No numbness or weakness  SKIN: No itching, burning, rashes, or lesions   All other review of systems is negative unless indicated above    Vital Signs Last 24 Hrs  T(C): 36.5, Max: 37.3 (04-12 @ 21:31)  T(F): 97.7, Max: 99.1 (04-12 @ 21:31)  HR: 71 (71 - 80)  BP: 101/54 (101/54 - 179/59)  BP(mean): --  RR: 16 (16 - 16)  SpO2: 95% (95% - 98%)    I&O's Summary    I & Os for current day (as of 13 Apr 2017 17:33)  =============================================  IN: 1230 ml / OUT: 0 ml / NET: 1230 ml      CAPILLARY BLOOD GLUCOSE      PHYSICAL EXAM:    Constitutional: NAD, awake and alert, well-developed  HEENT: PERR, EOMI, Normal Hearing, MMM  Neck: Soft and supple, No LAD, No JVD  Respiratory: Breath sounds are clear bilaterally, No wheezing, rales or rhonchi  Cardiovascular: S1 and S2, regular rate and rhythm, no Murmurs, gallops or rubs  Gastrointestinal: Bowel Sounds present, soft, nontender, nondistended, no guarding, no rebound  Extremities: +1 pitting edema on right leg. ; left leg no edema  Vascular: 2+ peripheral pulses  Neurological: A/O x 3, no focal deficits  Musculoskeletal: 5/5 strength b/l upper and lower extremities  Skin: No rashes    MEDICATIONS:  MEDICATIONS  (STANDING):  carbidopa/levodopa  25/100 2Tablet(s) Oral at bedtime  sodium chloride 0.9%. 1000milliLiter(s) IV Continuous <Continuous>  apixaban 10milliGRAM(s) Oral two times a day      LABS: All Labs Reviewed:                        10.6   6.6   )-----------( 216      ( 13 Apr 2017 04:34 )             32.3     04-12    143  |  108  |  42<H>  ----------------------------<  95  4.2   |  28  |  1.27    Ca    8.7      12 Apr 2017 05:38      PT/INR - ( 13 Apr 2017 04:34 )   PT: 17.3 sec;   INR: 1.59 ratio         PTT - ( 13 Apr 2017 06:14 )  PTT:142.4 sec        Radiology:    IMPRESSION:   Occlusive thrombus is seen in the RIGHT common femoral,   superficial femoral and popliteal veins. Scant flow is seen within the   RIGHT peroneal and posterior tibial veins. Calf edema is seen.    Critical value:  I discussed the finding of this report with Dr. Torres   at 3:44 PM on 4/9/2017.  Critical value policy of the hospital was   followed.  Read back and confirmation of receipt of this communication   was performed.  This verbal communication supplements the text report of   this document.      ASSESSMENT AND PLAN:      89 y/o M with hx of Diastolic CHF, Parkinson's, RBBB, Gait Disorder uses walker and Heart Murmur presents for RLE edema x 1 week, worsening. RLE swelling gradually went up to thigh, LLE swelling limited to foot. Denies CP, SOB, palpitations, abd pain, extremity pain. Pt saw Dr. Cedillo recently who doubled his dose of lasix from 20 mg to 40 mg and last few days 40mg BID.  -found to have RLE DVT, PE and ARF      1- RLE DVT/PE  V/Q scan noted: intermediate prob for PE  -Start Eliquis 10mg bid x 7 days, then 5mg bid x 3 months at least      2-MARCELL (acute kidney injury)   -likley secondary to dehydration\  -now improved   -re-start lasix tomamrrow with lower dose, 20mg daily (home dose 40)        3-Chronic diastolic congestive heart failure, compensated  -restart lasix tomarrow      4-Parkinson disease.  Plan: Medication and PT.       5- disposition: physical therapy, eulaliat decompensated    6-dvt proph- on eliquis
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
## 2017-04-14 VITALS
RESPIRATION RATE: 16 BRPM | OXYGEN SATURATION: 97 % | SYSTOLIC BLOOD PRESSURE: 138 MMHG | DIASTOLIC BLOOD PRESSURE: 61 MMHG | TEMPERATURE: 99 F | HEART RATE: 75 BPM

## 2017-04-14 LAB
HCT VFR BLD CALC: 32.1 % — LOW (ref 39–50)
HGB BLD-MCNC: 10.7 G/DL — LOW (ref 13–17)
INR BLD: 2.45 RATIO — HIGH (ref 0.88–1.16)
MCHC RBC-ENTMCNC: 29.9 PG — SIGNIFICANT CHANGE UP (ref 27–34)
MCHC RBC-ENTMCNC: 33.2 GM/DL — SIGNIFICANT CHANGE UP (ref 32–36)
MCV RBC AUTO: 89.8 FL — SIGNIFICANT CHANGE UP (ref 80–100)
PLATELET # BLD AUTO: 210 K/UL — SIGNIFICANT CHANGE UP (ref 150–400)
PROTHROM AB SERPL-ACNC: 26.9 SEC — HIGH (ref 9.8–12.7)
RBC # BLD: 3.57 M/UL — LOW (ref 4.2–5.8)
RBC # FLD: 13.1 % — SIGNIFICANT CHANGE UP (ref 10.3–14.5)
WBC # BLD: 6.7 K/UL — SIGNIFICANT CHANGE UP (ref 3.8–10.5)
WBC # FLD AUTO: 6.7 K/UL — SIGNIFICANT CHANGE UP (ref 3.8–10.5)

## 2017-04-14 RX ORDER — CARBIDOPA AND LEVODOPA 25; 100 MG/1; MG/1
2 TABLET ORAL
Qty: 0 | Refills: 0 | COMMUNITY

## 2017-04-14 RX ORDER — FUROSEMIDE 40 MG
1 TABLET ORAL
Qty: 0 | Refills: 0 | COMMUNITY

## 2017-04-14 RX ORDER — APIXABAN 2.5 MG/1
10 TABLET, FILM COATED ORAL
Qty: 0 | Refills: 0 | COMMUNITY

## 2017-04-14 RX ORDER — APIXABAN 2.5 MG/1
5 TABLET, FILM COATED ORAL
Qty: 0 | Refills: 0 | COMMUNITY

## 2017-04-14 RX ADMIN — APIXABAN 10 MILLIGRAM(S): 2.5 TABLET, FILM COATED ORAL at 06:46

## 2017-04-14 NOTE — DISCHARGE NOTE ADULT - PATIENT PORTAL LINK FT
“You can access the [facility] Patient Portal, offered by [facility], by registering with the following website: [email or web address]”

## 2017-04-14 NOTE — DISCHARGE NOTE ADULT - CARE PROVIDERS DIRECT ADDRESSES
[email or web address]

## 2017-04-14 NOTE — DISCHARGE NOTE ADULT - CARE PLAN
Principal Discharge DX:	Deep vein thrombosis (DVT) of right lower extremity, unspecified chronicity, unspecified vein  Goal:	patient will be started on Eliquis. Hold Eliquis today, repeat INR tommarrow. if less then 2, start Eliquis  Instructions for follow-up, activity and diet:	patient will be started on Eliquis. Hold Eliquis today, repeat INR tommarrow. if less then 2, start Eliquis  Secondary Diagnosis:	Other acute pulmonary embolism
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
## 2017-04-14 NOTE — PROGRESS NOTE ADULT - ASSESSMENT
1. Right leg DVT. Unprovoked but patient relatively sedentary due to Parkinsons. possible PE  2.HFpEF. Compensated. EF 55-60%  3.Moderate AR, AV sclerosis.  4.Chronic RBBB and LAHB.  5.Parkinsons  6.Acute pre-renal azotemia due to furosemide. Resolving off diuretics.  Plan: cont noac. suggest d/c ivf, inc activity

## 2017-04-14 NOTE — DISCHARGE NOTE ADULT - PLAN OF CARE
patient will be started on Eliquis. Hold Eliquis today, repeat INR tommarrow. if less then 2, start Eliquis

## 2017-04-14 NOTE — DISCHARGE NOTE ADULT - MEDICATION SUMMARY - MEDICATIONS TO TAKE
I will START or STAY ON the medications listed below when I get home from the hospital:    Sinemet 25 mg-100 mg oral tablet  -- 2 tab(s) by mouth once (at bedtime)  -- Indication: For Parkinson disease    Lasix 20 mg oral tablet  -- 1 tab(s) by mouth once a day  -- Indication: For Chronic diastolic congestive heart failure    Klor-Con 10 mEq oral tablet, extended release  --  by mouth once a day  -- Indication: For Chronic diastolic congestive heart failure

## 2017-04-14 NOTE — DISCHARGE NOTE ADULT - HOSPITAL COURSE
Vital Signs Last 24 Hrs  T(C): 36.7, Max: 36.7 (04-13 @ 19:54)  T(F): 98.1, Max: 98.1 (04-13 @ 19:54)  HR: 77 (77 - 77)  BP: 151/57 (151/57 - 151/57)  BP(mean): --  RR: --  SpO2: 96% (96% - 96%)    HEENT:   pupils equal and reactive, EOMI, no oropharyngeal lesions, erythema, exudates, oral thrush    NECK:   supple, no carotid bruits, no palpable lymph nodes, no thyromegaly    CV:  +S1, +S2, regular, no murmurs or rubs    RESP:   lungs clear to auscultation bilaterally, no wheezing, rales, rhonchi, good air entry bilaterally    BREAST:  not examined    GI:  abdomen soft, non-tender, non-distended, normal BS, no bruits, no abdominal masses, no palpable masses    RECTAL:  not examined    :  not examined    MSK:   normal muscle tone, no atrophy, no rigidity, no contractions    EXT:   rt lower extremity +1 pitting edema ; no edema left leg.     VASCULAR:  pulses equal and symmetric in the upper and lower extremities    NEURO:  AAOX3, no focal neurological deficits, follows all commands, able to move extremities spontaneously    SKIN:  no ulcers, lesions or rashes        PT/INR - ( 14 Apr 2017 06:33 )   PT: 26.9 sec;   INR: 2.45 ratio         PTT - ( 13 Apr 2017 06:14 )  PTT:142.4 secCBC Full  -  ( 14 Apr 2017 06:33 )  WBC Count : 6.7 K/uL  Hemoglobin : 10.7 g/dL  Hematocrit : 32.1 %  Platelet Count - Automated : 210 K/uL  Mean Cell Volume : 89.8 fl  Mean Cell Hemoglobin : 29.9 pg  Mean Cell Hemoglobin Concentration : 33.2 gm/dL        hospital course      89 y/o M with hx of Diastolic CHF, Parkinson's, RBBB, Gait Disorder uses walker and Heart Murmur presents for RLE edema x 1 week, worsening. RLE swelling gradually went up to thigh, LLE swelling limited to foot, found to have RLE DVT, PE and ARF      1- RLE DVT/PE  V/Q scan noted: intermediate prob for PE and u/s lower extremity shows dvt. Patient was initally started on heparin drip and coumadin. After re-evaluation it was determined that thispatietn will do better without the restrictions of coumadin.  INR is however elevated at  2.45. Hold all anticoagulation today , w/ repeat INR tomarrow. If inr is below 2, can start Eliquis 10mg bid x 7 days, then after can start 5mg bid x 3 months. Explained plan to family whom is at bedside. In agreement with this plan. The right leg is edematous from the DVT .     2-MARCELL (acute kidney injury)   -likley secondary to dehydration\ and over use of diuretics. now improved   -have decreased his home dose of lasix to 20mg daily from 40.       Patient is alert and oriented, will benefit from rehab. Family at bedside aware of plan.

## 2017-04-14 NOTE — DISCHARGE NOTE ADULT - CARE PROVIDER_API CALL
Monster Noel (MD), Internal Medicine  [address]   [address] [place] [address]  Phone: [phone]  Fax: [phone]

## 2017-04-18 DIAGNOSIS — I82.401 ACUTE EMBOLISM AND THROMBOSIS OF UNSPECIFIED DEEP VEINS OF RIGHT LOWER EXTREMITY: ICD-10-CM

## 2017-04-18 DIAGNOSIS — N17.9 ACUTE KIDNEY FAILURE, UNSPECIFIED: ICD-10-CM

## 2017-04-18 DIAGNOSIS — G20 PARKINSON'S DISEASE: ICD-10-CM

## 2017-04-18 DIAGNOSIS — I26.99 OTHER PULMONARY EMBOLISM WITHOUT ACUTE COR PULMONALE: ICD-10-CM

## 2017-04-18 DIAGNOSIS — I50.32 CHRONIC DIASTOLIC (CONGESTIVE) HEART FAILURE: ICD-10-CM

## 2017-04-18 DIAGNOSIS — I45.10 UNSPECIFIED RIGHT BUNDLE-BRANCH BLOCK: ICD-10-CM

## 2017-05-09 PROBLEM — Z00.00 ENCOUNTER FOR PREVENTIVE HEALTH EXAMINATION: Status: ACTIVE | Noted: 2017-05-09

## 2017-05-12 ENCOUNTER — APPOINTMENT (OUTPATIENT)
Dept: HOME HEALTH SERVICES | Facility: HOME HEALTH | Age: 82
End: 2017-05-12

## 2017-05-12 VITALS
WEIGHT: 146 LBS | SYSTOLIC BLOOD PRESSURE: 110 MMHG | RESPIRATION RATE: 16 BRPM | HEIGHT: 66 IN | BODY MASS INDEX: 23.46 KG/M2 | DIASTOLIC BLOOD PRESSURE: 50 MMHG | OXYGEN SATURATION: 98 % | TEMPERATURE: 96.8 F | HEART RATE: 66 BPM

## 2017-05-12 VITALS
HEIGHT: 66 IN | DIASTOLIC BLOOD PRESSURE: 50 MMHG | HEART RATE: 66 BPM | OXYGEN SATURATION: 98 % | RESPIRATION RATE: 16 BRPM | TEMPERATURE: 96.8 F | BODY MASS INDEX: 23.46 KG/M2 | SYSTOLIC BLOOD PRESSURE: 110 MMHG | WEIGHT: 146 LBS

## 2017-05-12 DIAGNOSIS — S93.409A SPRAIN OF UNSPECIFIED LIGAMENT OF UNSPECIFIED ANKLE, INITIAL ENCOUNTER: ICD-10-CM

## 2017-05-12 DIAGNOSIS — D51.9 VITAMIN B12 DEFICIENCY ANEMIA, UNSPECIFIED: ICD-10-CM

## 2017-05-12 DIAGNOSIS — I50.9 HEART FAILURE, UNSPECIFIED: ICD-10-CM

## 2017-05-12 DIAGNOSIS — L89.301 PRESSURE ULCER OF UNSPECIFIED BUTTOCK, STAGE 1: ICD-10-CM

## 2017-05-12 RX ORDER — AZITHROMYCIN 250 MG/1
250 TABLET, FILM COATED ORAL
Qty: 6 | Refills: 0 | Status: COMPLETED | COMMUNITY
Start: 2017-01-05

## 2017-05-12 RX ORDER — FUROSEMIDE 40 MG/1
40 TABLET ORAL
Qty: 30 | Refills: 0 | Status: DISCONTINUED | COMMUNITY
Start: 2017-04-03

## 2017-05-14 PROBLEM — D51.9 B12 DEFICIENCY ANEMIA: Status: RESOLVED | Noted: 2017-05-12 | Resolved: 2017-05-14

## 2017-05-14 RX ADMIN — CYANOCOBALAMIN 0 MCG/ML: 1000 INJECTION INTRAMUSCULAR; SUBCUTANEOUS at 00:00

## 2017-06-06 ENCOUNTER — CLINICAL ADVICE (OUTPATIENT)
Age: 82
End: 2017-06-06

## 2017-06-06 DIAGNOSIS — L60.3 NAIL DYSTROPHY: ICD-10-CM

## 2017-06-07 ENCOUNTER — CLINICAL ADVICE (OUTPATIENT)
Age: 82
End: 2017-06-07

## 2017-06-07 LAB
ANION GAP SERPL CALC-SCNC: 15 MMOL/L
BASOPHILS # BLD AUTO: 0.03 K/UL
BASOPHILS NFR BLD AUTO: 0.4 %
BUN SERPL-MCNC: 39 MG/DL
CALCIUM SERPL-MCNC: 9.4 MG/DL
CHLORIDE SERPL-SCNC: 105 MMOL/L
CO2 SERPL-SCNC: 22 MMOL/L
CREAT SERPL-MCNC: 1.51 MG/DL
EOSINOPHIL # BLD AUTO: 0.11 K/UL
EOSINOPHIL NFR BLD AUTO: 1.4 %
FOLATE SERPL-MCNC: 12.3 NG/ML
GLUCOSE SERPL-MCNC: 123 MG/DL
HCT VFR BLD CALC: 42.5 %
HGB BLD-MCNC: 13.4 G/DL
IMM GRANULOCYTES NFR BLD AUTO: 0.8 %
LYMPHOCYTES # BLD AUTO: 2.13 K/UL
LYMPHOCYTES NFR BLD AUTO: 27.8 %
MAN DIFF?: NORMAL
MCHC RBC-ENTMCNC: 29.2 PG
MCHC RBC-ENTMCNC: 31.5 GM/DL
MCV RBC AUTO: 92.6 FL
MONOCYTES # BLD AUTO: 0.56 K/UL
MONOCYTES NFR BLD AUTO: 7.3 %
NEUTROPHILS # BLD AUTO: 4.78 K/UL
NEUTROPHILS NFR BLD AUTO: 62.3 %
PLATELET # BLD AUTO: 245 K/UL
POTASSIUM SERPL-SCNC: 5.5 MMOL/L
RBC # BLD: 4.59 M/UL
RBC # FLD: 14.8 %
SODIUM SERPL-SCNC: 142 MMOL/L
TSH SERPL-ACNC: 3.69 UIU/ML
URATE SERPL-MCNC: 9.1 MG/DL
VIT B12 SERPL-MCNC: 1642 PG/ML
WBC # FLD AUTO: 7.67 K/UL

## 2017-06-07 RX ORDER — POTASSIUM CHLORIDE 750 MG/1
10 TABLET, EXTENDED RELEASE ORAL
Qty: 30 | Refills: 0 | Status: DISCONTINUED | COMMUNITY
Start: 2017-04-03 | End: 2017-06-07

## 2017-06-07 RX ORDER — POTASSIUM CHLORIDE 750 MG/1
10 TABLET, FILM COATED, EXTENDED RELEASE ORAL DAILY
Qty: 1 | Refills: 0 | Status: COMPLETED | COMMUNITY
Start: 2017-05-05 | End: 2017-06-07

## 2017-06-20 ENCOUNTER — APPOINTMENT (OUTPATIENT)
Dept: HOME HEALTH SERVICES | Facility: HOME HEALTH | Age: 82
End: 2017-06-20

## 2017-06-20 VITALS
DIASTOLIC BLOOD PRESSURE: 76 MMHG | HEART RATE: 66 BPM | SYSTOLIC BLOOD PRESSURE: 126 MMHG | WEIGHT: 146 LBS | BODY MASS INDEX: 23.46 KG/M2 | HEIGHT: 66 IN

## 2017-06-20 DIAGNOSIS — M25.473 EFFUSION, UNSPECIFIED ANKLE: ICD-10-CM

## 2017-06-20 DIAGNOSIS — M10.9 GOUT, UNSPECIFIED: ICD-10-CM

## 2017-07-09 ENCOUNTER — MED ADMIN CHARGE (OUTPATIENT)
Age: 82
End: 2017-07-09

## 2017-07-09 RX ORDER — CYANOCOBALAMIN 1000 UG/ML
1000 INJECTION INTRAMUSCULAR; SUBCUTANEOUS
Qty: 0 | Refills: 0 | Status: COMPLETED | OUTPATIENT
Start: 2017-05-14

## 2017-08-23 ENCOUNTER — APPOINTMENT (OUTPATIENT)
Dept: HOME HEALTH SERVICES | Facility: HOME HEALTH | Age: 82
End: 2017-08-23
Payer: MEDICARE

## 2017-08-23 VITALS
OXYGEN SATURATION: 97 % | DIASTOLIC BLOOD PRESSURE: 70 MMHG | TEMPERATURE: 97 F | HEIGHT: 66 IN | RESPIRATION RATE: 16 BRPM | WEIGHT: 146 LBS | SYSTOLIC BLOOD PRESSURE: 122 MMHG | BODY MASS INDEX: 23.46 KG/M2 | HEART RATE: 70 BPM

## 2017-08-23 PROCEDURE — 99349 HOME/RES VST EST MOD MDM 40: CPT

## 2017-08-23 RX ORDER — COLCHICINE 0.6 MG/1
0.6 TABLET ORAL TWICE DAILY
Qty: 40 | Refills: 0 | Status: DISCONTINUED | COMMUNITY
Start: 2017-06-07 | End: 2017-08-23

## 2017-10-31 ENCOUNTER — APPOINTMENT (OUTPATIENT)
Dept: HOME HEALTH SERVICES | Facility: HOME HEALTH | Age: 82
End: 2017-10-31
Payer: MEDICARE

## 2017-10-31 VITALS
OXYGEN SATURATION: 93 % | SYSTOLIC BLOOD PRESSURE: 122 MMHG | BODY MASS INDEX: 23.78 KG/M2 | DIASTOLIC BLOOD PRESSURE: 60 MMHG | TEMPERATURE: 97.1 F | WEIGHT: 148 LBS | HEART RATE: 71 BPM | HEIGHT: 66 IN | RESPIRATION RATE: 16 BRPM

## 2017-10-31 DIAGNOSIS — G20 PARKINSON'S DISEASE: ICD-10-CM

## 2017-10-31 DIAGNOSIS — I82.491 ACUTE EMBOLISM AND THROMBOSIS OF OTHER SPECIFIED DEEP VEIN OF RIGHT LOWER EXTREMITY: ICD-10-CM

## 2017-10-31 DIAGNOSIS — Z86.711 PERSONAL HISTORY OF PULMONARY EMBOLISM: ICD-10-CM

## 2017-10-31 DIAGNOSIS — Z71.89 OTHER SPECIFIED COUNSELING: ICD-10-CM

## 2017-10-31 DIAGNOSIS — Z23 ENCOUNTER FOR IMMUNIZATION: ICD-10-CM

## 2017-10-31 DIAGNOSIS — R53.1 WEAKNESS: ICD-10-CM

## 2017-10-31 PROCEDURE — 90686 IIV4 VACC NO PRSV 0.5 ML IM: CPT

## 2017-10-31 PROCEDURE — 99497 ADVNCD CARE PLAN 30 MIN: CPT

## 2017-10-31 PROCEDURE — 99349 HOME/RES VST EST MOD MDM 40: CPT | Mod: 25

## 2017-10-31 PROCEDURE — G0008: CPT

## 2017-10-31 RX ORDER — ALUMINIUM HYDROXIDE 0.28 G/100G
0.28 OINTMENT TOPICAL
Qty: 1 | Refills: 0 | Status: ACTIVE | COMMUNITY
Start: 2017-05-12

## 2017-12-05 ENCOUNTER — OTHER (OUTPATIENT)
Age: 82
End: 2017-12-05

## 2017-12-09 ENCOUNTER — MEDICATION RENEWAL (OUTPATIENT)
Age: 82
End: 2017-12-09

## 2017-12-09 DIAGNOSIS — R09.82 POSTNASAL DRIP: ICD-10-CM

## 2017-12-09 RX ORDER — FLUTICASONE PROPIONATE 50 UG/1
50 SPRAY, METERED NASAL
Qty: 1 | Refills: 3 | Status: ACTIVE | COMMUNITY
Start: 2017-12-09 | End: 1900-01-01

## 2017-12-09 RX ORDER — CARBIDOPA AND LEVODOPA 25; 100 MG/1; MG/1
25-100 TABLET ORAL 4 TIMES DAILY
Qty: 360 | Refills: 3 | Status: ACTIVE | COMMUNITY
Start: 2017-05-12 | End: 1900-01-01

## 2017-12-09 RX ORDER — APIXABAN 5 MG/1
5 TABLET, FILM COATED ORAL
Qty: 180 | Refills: 3 | Status: ACTIVE | COMMUNITY
Start: 2017-05-05 | End: 1900-01-01

## 2017-12-13 ENCOUNTER — MOBILE ON CALL (OUTPATIENT)
Age: 82
End: 2017-12-13

## 2017-12-13 ENCOUNTER — CLINICAL ADVICE (OUTPATIENT)
Age: 82
End: 2017-12-13

## 2017-12-16 ENCOUNTER — CLINICAL ADVICE (OUTPATIENT)
Age: 82
End: 2017-12-16

## 2017-12-16 DIAGNOSIS — Z87.09 PERSONAL HISTORY OF OTHER DISEASES OF THE RESPIRATORY SYSTEM: ICD-10-CM

## 2017-12-20 ENCOUNTER — APPOINTMENT (OUTPATIENT)
Dept: HOME HEALTH SERVICES | Facility: HOME HEALTH | Age: 82
End: 2017-12-20

## 2017-12-20 VITALS
OXYGEN SATURATION: 96 % | RESPIRATION RATE: 16 BRPM | TEMPERATURE: 96.1 F | HEART RATE: 65 BPM | SYSTOLIC BLOOD PRESSURE: 140 MMHG | DIASTOLIC BLOOD PRESSURE: 70 MMHG

## 2017-12-21 ENCOUNTER — CLINICAL ADVICE (OUTPATIENT)
Age: 82
End: 2017-12-21

## 2017-12-21 RX ORDER — FUROSEMIDE 40 MG/1
40 TABLET ORAL DAILY
Qty: 1 | Refills: 3 | Status: ACTIVE | COMMUNITY
Start: 2017-05-05

## 2017-12-21 RX ORDER — AZITHROMYCIN 250 MG/1
250 TABLET, FILM COATED ORAL
Qty: 1 | Refills: 0 | Status: COMPLETED | COMMUNITY
Start: 2017-12-16 | End: 2017-12-21

## 2017-12-28 ENCOUNTER — MOBILE ON CALL (OUTPATIENT)
Age: 82
End: 2017-12-28

## 2017-12-28 ENCOUNTER — LABORATORY RESULT (OUTPATIENT)
Age: 82
End: 2017-12-28

## 2017-12-28 ENCOUNTER — CLINICAL ADVICE (OUTPATIENT)
Age: 82
End: 2017-12-28

## 2017-12-28 DIAGNOSIS — R05 COUGH: ICD-10-CM

## 2017-12-28 DIAGNOSIS — R09.89 OTHER SPECIFIED SYMPTOMS AND SIGNS INVOLVING THE CIRCULATORY AND RESPIRATORY SYSTEMS: ICD-10-CM

## 2017-12-28 RX ORDER — LEVOFLOXACIN 500 MG/1
500 TABLET, FILM COATED ORAL DAILY
Qty: 7 | Refills: 0 | Status: ACTIVE | COMMUNITY
Start: 2017-12-28 | End: 1900-01-01

## 2017-12-29 ENCOUNTER — CLINICAL ADVICE (OUTPATIENT)
Age: 82
End: 2017-12-29

## 2017-12-29 ENCOUNTER — MEDICATION RENEWAL (OUTPATIENT)
Age: 82
End: 2017-12-29

## 2017-12-29 DIAGNOSIS — R26.81 UNSTEADINESS ON FEET: ICD-10-CM

## 2017-12-29 LAB
ANION GAP SERPL CALC-SCNC: 16 MMOL/L
APPEARANCE: CLEAR
BASOPHILS # BLD AUTO: 0.02 K/UL
BASOPHILS NFR BLD AUTO: 0.3 %
BILIRUBIN URINE: NEGATIVE
BLOOD URINE: NEGATIVE
BUN SERPL-MCNC: 42 MG/DL
CALCIUM SERPL-MCNC: 9.7 MG/DL
CHLORIDE SERPL-SCNC: 99 MMOL/L
CO2 SERPL-SCNC: 28 MMOL/L
COLOR: YELLOW
CREAT SERPL-MCNC: 1.61 MG/DL
EOSINOPHIL # BLD AUTO: 0.09 K/UL
EOSINOPHIL NFR BLD AUTO: 1.2
GLUCOSE QUALITATIVE U: NEGATIVE MG/DL
GLUCOSE SERPL-MCNC: 75 MG/DL
HCT VFR BLD CALC: 42.3 %
HGB BLD-MCNC: 13.4 G/DL
IMM GRANULOCYTES NFR BLD AUTO: 1.1 %
KETONES URINE: NEGATIVE
LEUKOCYTE ESTERASE URINE: NEGATIVE
LYMPHOCYTES # BLD AUTO: 1.36 K/UL
LYMPHOCYTES NFR BLD AUTO: 18 %
MAN DIFF?: NORMAL
MCHC RBC-ENTMCNC: 28.6 PG
MCHC RBC-ENTMCNC: 31.7 GM/DL
MCV RBC AUTO: 90.2 FL
MONOCYTES # BLD AUTO: 0.79 K/UL
MONOCYTES NFR BLD AUTO: 10.4 %
NEUTROPHILS # BLD AUTO: 5.23 K/UL
NEUTROPHILS NFR BLD AUTO: 69 %
NITRITE URINE: NEGATIVE
PH URINE: 6
PLATELET # BLD AUTO: 158 K/UL
POTASSIUM SERPL-SCNC: 4.6 MMOL/L
PROTEIN URINE: NEGATIVE MG/DL
RBC # BLD: 4.69 M/UL
RBC # FLD: 14.6 %
SODIUM SERPL-SCNC: 143 MMOL/L
SPECIFIC GRAVITY URINE: 1.01
UROBILINOGEN URINE: 1 MG/DL
WBC # FLD AUTO: 7.57 K/UL

## 2017-12-30 ENCOUNTER — CLINICAL ADVICE (OUTPATIENT)
Age: 82
End: 2017-12-30

## 2017-12-30 ENCOUNTER — MOBILE ON CALL (OUTPATIENT)
Age: 82
End: 2017-12-30

## 2017-12-31 ENCOUNTER — CLINICAL ADVICE (OUTPATIENT)
Age: 82
End: 2017-12-31

## 2017-12-31 DIAGNOSIS — G20 PARKINSON'S DISEASE: ICD-10-CM

## 2017-12-31 DIAGNOSIS — F02.81 PARKINSON'S DISEASE: ICD-10-CM

## 2018-01-02 ENCOUNTER — MEDICATION RENEWAL (OUTPATIENT)
Age: 83
End: 2018-01-02

## 2020-12-15 PROBLEM — Z87.09 HISTORY OF ACUTE BRONCHITIS: Status: RESOLVED | Noted: 2017-12-16 | Resolved: 2020-12-15

## 2021-11-24 NOTE — PATIENT PROFILE ADULT. - EXTENSIONS OF SELF_ADULT
Care Management Follow Up    Length of Stay (days): 0    Expected Discharge Date: 11/26/2021     Concerns to be Addressed: discharge planning, UC pending, IV antibotics       Patient plan of care discussed at interdisciplinary rounds: Yes    Anticipated Discharge Disposition: home with 24 assist/supervision; declining Tcu discharge option     Anticipated Discharge Services: increased home care services with [facility], step-son will arrange  Anticipated Discharge DME: none    Patient/family educated on Medicare website which has current facility and service quality ratings: N/A, declined TCU option   Education Provided on the Discharge Plan: per team   Patient/Family in Agreement with the Plan: yes     Referrals Placed by CM/SW: not at this time   Private pay costs discussed: Not applicable    Additional Information:  Call from step-son Jamie. Patient declining TCU discharge option. Plan is home with family assist and 24 hour supervision/increased private pay home care services with [facility]. Currently pt has services with [phone] with [facility]. Step son will call [facility] and arrange for increased services to provide for 24 hr assist/supervision.      Yashira Hernandez RN       None